# Patient Record
Sex: FEMALE | Race: WHITE
[De-identification: names, ages, dates, MRNs, and addresses within clinical notes are randomized per-mention and may not be internally consistent; named-entity substitution may affect disease eponyms.]

---

## 2021-11-22 ENCOUNTER — HOSPITAL ENCOUNTER (EMERGENCY)
Dept: HOSPITAL 97 - ER | Age: 36
Discharge: HOME | End: 2021-11-22
Payer: SELF-PAY

## 2021-11-22 VITALS — SYSTOLIC BLOOD PRESSURE: 130 MMHG | TEMPERATURE: 97.8 F | DIASTOLIC BLOOD PRESSURE: 81 MMHG | OXYGEN SATURATION: 98 %

## 2021-11-22 DIAGNOSIS — S83.92XA: Primary | ICD-10-CM
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
## 2021-11-22 DIAGNOSIS — Z88.0: ICD-10-CM

## 2021-11-22 DIAGNOSIS — F17.210: ICD-10-CM

## 2021-11-22 PROCEDURE — 96372 THER/PROPH/DIAG INJ SC/IM: CPT

## 2021-11-22 PROCEDURE — 99283 EMERGENCY DEPT VISIT LOW MDM: CPT

## 2021-11-22 NOTE — ER
Nurse's Notes                                                                                     

 [facility]                                                                 

Name: Shimon Varner                                                                            

Age: 36 yrs                                                                                       

Sex: Female                                                                                       

: 1985                                                                                   

MRN: [id number]                                                                                   

Arrival Date: 2021                                                                          

Time: 09:34                                                                                       

Account#: [id number]                                                                            

Bed 10                                                                                            

Private MD:                                                                                       

Diagnosis: Sprain of unspecified site of left knee;Pain in left knee                              

                                                                                                  

Presentation:                                                                                     

                                                                                             

09:41 Chief complaint: Patient states: slipped and twisted left knee in the shower.           aa5 

      Coronavirus screen: At this time, the client does not indicate any symptoms associated      

      with coronavirus-19. Ebola Screen: No symptoms or risks identified at this time.            

      Initial Sepsis Screen: Does the patient meet any 2 criteria? HR > 90 bpm. Does the          

      patient have a suspected source of infection? No. Patient's initial sepsis screen is        

      negative. Risk Assessment: Do you want to hurt yourself or someone else? Patient            

      reports no desire to harm self or others. Onset of symptoms was 2021.          

09:41 Acuity: JULIAN 4                                                                           aa5 

09:41 Method Of Arrival: Wheelchair                                                           aa5 

                                                                                                  

Historical:                                                                                       

- Allergies:                                                                                      

09:42 PENICILLINS;                                                                            aa5 

- PMHx:                                                                                           

09:42 Anxiety; PTSD; Depressive disorder; Bipolar disorder;                                   aa5 

- PSHx:                                                                                           

09:42  section;                                                                       aa5 

                                                                                                  

- Immunization history:: Client reports receiving the Lc \T\ Lc single-dose             

  vaccine.                                                                                        

- Social history:: Smoking status: Patient reports the use of cigarette tobacco                   

  products, smokes one-half pack cigarettes per day.                                              

                                                                                                  

                                                                                                  

Screening:                                                                                        

10:42 Abuse screen: Denies threats or abuse. Denies injuries from another. Nutritional        iw  

      screening: No deficits noted. Tuberculosis screening: No symptoms or risk factors           

      identified. Fall Risk None identified.                                                      

                                                                                                  

Assessment:                                                                                       

10:41 General: Appears in no apparent distress. Behavior is calm, cooperative. Pain:          iw  

      Complains of pain in left knee. Neuro: No deficits noted. Level of Consciousness is         

      awake, alert, obeys commands, Oriented to person, place, time, situation. Respiratory:      

      Respiratory effort is even, unlabored, Respiratory pattern is regular, symmetrical.         

      Derm: Skin is intact, is healthy with good turgor. Musculoskeletal: Range of motion:        

      limited in left knee.                                                                       

                                                                                                  

Vital Signs:                                                                                      

09:41  / 81; Pulse 92; Resp 18 S; Temp 97.8(TE); Pulse Ox 98% on R/A; Weight 151.95 kg  aa5 

      (R); Height 5 ft. 6 in. (167.64 cm) (R);                                                    

09:41 Body Mass Index 54.07 (151.95 kg, 167.64 cm)                                            aa5 

                                                                                                  

ED Course:                                                                                        

09:34 Patient arrived in ED.                                                                  as  

09:41 Arm band placed on.                                                                     aa5 

09:42 Triage completed.                                                                       aa5 

09:51 Iker Randall NP is PHCP.                                                           pm1 

09:51 Dae Benitez MD is Attending Physician.                                              pm1 

09:52 Shanti Haines RN is Primary Nurse.                                                   iw  

10:38 Knee Left 3 View In Process Unspecified.                                                EDMS

10:41 Patient has correct armband on for positive identification.                             iw  

11:42 Knee immobilizer applied on left knee.                                                  dh3 

11:48 No provider procedures requiring assistance completed. Patient did not have IV access   iw  

      during this emergency room visit.                                                           

                                                                                                  

Administered Medications:                                                                         

10:22 Drug: Ketorolac 60 mg Route: IM; Site: left vastus lateralis;                           iw  

14:55 Follow up: Response: No adverse reaction                                                iw  

                                                                                                  

                                                                                                  

Outcome:                                                                                          

11:31 Discharge ordered by MD.                                                                pm1 

11:48 Discharged to home ambulatory, with family.                                             iw  

11:48 Condition: good                                                                             

11:48 Discharge instructions given to patient, Instructed on discharge instructions, follow       

      up and referral plans. Demonstrated understanding of instructions, follow-up care,          

      medications.                                                                                

11:49 Patient left the ED.                                                                    iw  

                                                                                                  

Signatures:                                                                                       

Dispatcher [employer]                           EDMS                                                 

Jana Villanueva                             as                                                   

Shanti Haines RN RN                                                      

Radha Zapata RN                     RN   aa5                                                  

Iker Randall NP                    NP   pm1                                                  

Tiny Dooley                              3                                                  

                                                                                                  

Corrections: (The following items were deleted from the chart)                                    

09:43 09:42 PSHx: Carotid endarterectomy; aa5                                                 aa5 

09:43 09:42 Immunization history: Client reports receiving the 2nd dose of the Covid vaccine, aa5 

      aa5                                                                                         

09:44 09:41 Initial Sepsis Screen: Does the patient meet any 2 criteria? No. Patient's        aa5 

      initial sepsis screen is negative. Does the patient have a suspected source of              

      infection? No. Patient's initial sepsis screen is negative. aa5                             

                                                                                                  

**************************************************************************************************

## 2021-11-22 NOTE — EDPHYS
Physician Documentation                                                                           

 [facility]                                                                 

Name: Shimon Varner                                                                            

Age: 36 yrs                                                                                       

Sex: Female                                                                                       

: 1985                                                                                   

MRN: [id number]                                                                                   

Arrival Date: 2021                                                                          

Time: 09:34                                                                                       

Account#: [id number]                                                                            

Bed 10                                                                                            

Private MD:                                                                                       

ED Physician Dae Benitez                                                                       

HPI:                                                                                              

                                                                                             

10:05 This 36 yrs old Female presents to ER via Wheelchair with complaints of Knee Injury.    pm1 

10:05 The patient presents with pain, that is acute. The complaints affect the left knee.     pm1 

      Context: The problem was sustained Stepping out of shower, resulted from twisting of        

      the extremity, Slip on water, the patient is able to ambulate, Problem is a result from     

      a previous injury: No. Onset: The symptoms/episode began/occurred today. Modifying          

      factors: The symptoms are alleviated by elevating leg, remaining still, the symptoms        

      are aggravated by movement, weight bearing, bending knee. Associated signs and              

      symptoms: Pertinent negatives numbness, tingling. Treatment prior to arrival includes:      

      no previous treatment. Severity of symptoms: in the emergency department the symptoms       

      are unchanged. The patient has not experienced similar symptoms in the past. The            

      patient has not recently seen a physician. Patient was stepping out of the shower and       

      slipped resulting in twisting of the left knee. Patient did not fall to the ground.         

                                                                                                  

Historical:                                                                                       

- Allergies:                                                                                      

09:42 PENICILLINS;                                                                            aa5 

- PMHx:                                                                                           

09:42 Anxiety; PTSD; Depressive disorder; Bipolar disorder;                                   aa5 

- PSHx:                                                                                           

09:42  section;                                                                       aa5 

                                                                                                  

- Immunization history:: Client reports receiving the Lc \T\ Lc single-dose             

  vaccine.                                                                                        

- Social history:: Smoking status: Patient reports the use of cigarette tobacco                   

  products, smokes one-half pack cigarettes per day.                                              

                                                                                                  

                                                                                                  

ROS:                                                                                              

10:05 Constitutional: Negative for fever, chills, and weight loss, Cardiovascular: Negative   pm1 

      for chest pain, palpitations, and edema, Respiratory: Negative for shortness of breath,     

      cough, wheezing, and pleuritic chest pain.                                                  

10:05 Skin: Negative for injury, rash, and discoloration, Neuro: Negative for headache,           

      weakness, numbness, tingling, and seizure.                                                  

10:05 MS/extremity: Positive for pain, swelling, of the left knee, Negative for decreased         

      range of motion, deformity.                                                                 

10:05 All other systems are negative.                                                             

                                                                                                  

Exam:                                                                                             

10:05 Constitutional:  This is a well developed, well nourished patient who is awake, alert,  pm1 

      and in no acute distress. Head/Face:  Normocephalic, atraumatic.                            

10:05 Cardiovascular: Exam negative for  acute changes, Rate: normal, Rhythm: regular,            

      Pulses: no pulse deficits are appreciated.                                                  

10:05 Respiratory: Exam negative for  acute changes, respiratory distress, shortness of           

      breath.                                                                                     

10:05 Musculoskeletal/extremity: Extremities: noted in the left knee: Pain to lateral aspect      

      of knee with rotation of lower leg to medial aspect and pain to medial aspect of knee       

      with rotation of lower leg to lateral aspect.  No pain with drawer test, There is no        

      evidence of  decreased ROM, deformity.                                                      

10:05 Skin: Exam negative for                                                                     

10:05 Neuro: Exam negative for acute changes, Orientation: is normal, Mentation: is normal,       

      Motor: is normal, moves all fours.                                                          

                                                                                                  

Vital Signs:                                                                                      

09:41  / 81; Pulse 92; Resp 18 S; Temp 97.8(TE); Pulse Ox 98% on R/A; Weight 151.95 kg  aa5 

      (R); Height 5 ft. 6 in. (167.64 cm) (R);                                                    

09:41 Body Mass Index 54.07 (151.95 kg, 167.64 cm)                                            aa5 

                                                                                                  

MDM:                                                                                              

10:01 Patient medically screened.                                                             pm1 

11:30 Data reviewed: vital signs. Data interpreted: Pulse oximetry: on room air is 98 %.      pm1 

      Interpretation: normal.                                                                     

11:30 Counseling: I had a detailed discussion with the patient and/or guardian regarding: the pm1 

      historical points, exam findings, and any diagnostic results supporting the                 

      discharge/admit diagnosis, radiology results, the need for outpatient follow up, a          

      orthopedic surgeon, possible MRI and further treatment and evaluation, to return to the     

      emergency department if symptoms worsen or persist or if there are any questions or         

      concerns that arise at home.                                                                

                                                                                                  

                                                                                             

10:05 Order name: Knee Left 3 View XRAY                                                       pm1 

                                                                                             

10:05 Order name: Knee Left 3 View; Complete Time: 11:11                                      EDMS

                                                                                             

11:12 Order name: Knee Immobilizer; Complete Time: 11:41                                      pm1 

                                                                                                  

Administered Medications:                                                                         

10: Drug: Ketorolac 60 mg Route: IM; Site: left vastus lateralis;                           iw  

14:55 Follow up: Response: No adverse reaction                                                iw  

                                                                                                  

                                                                                                  

Disposition:                                                                                      

13:38 Co-signature as Attending Physician, Dae Benitez MD I agree with the assessment and   kdr 

      plan of care.                                                                               

                                                                                                  

Disposition Summary:                                                                              

21 11:31                                                                                    

Discharge Ordered                                                                                 

      Location: Home                                                                          pm1 

      Problem: new                                                                            pm1 

      Symptoms: have improved                                                                 pm1 

      Condition: Stable                                                                       pm1 

      Diagnosis                                                                                   

        - Sprain of unspecified site of left knee                                             pm1 

        - Pain in left knee                                                                   pm1 

      Followup:                                                                               pm1 

        - With: Emergency Department                                                               

        - When: As needed                                                                          

        - Reason: Worsening of condition                                                           

      Followup:                                                                               pm1 

        - With: Private Physician                                                                  

        - When: 2 - 3 days                                                                         

        - Reason: Recheck today's complaints, Continuance of care, Re-evaluation by your           

      physician                                                                                   

      Discharge Instructions:                                                                     

        - Discharge Summary Sheet                                                             pm1 

        - Elastic Bandage and RICE Therapy                                                    pm1 

        - Knee Sprain, Adult                                                                  pm1 

        - Crutch Use, Adult                                                                   pm1 

        - How to Use a Knee Immobilizer                                                       pm1 

        - Acute Knee Pain, Adult                                                              pm1 

      Forms:                                                                                      

        - Medication Reconciliation Form                                                      pm1 

        - Thank You Letter                                                                    pm1 

        - Antibiotic Education                                                                pm1 

        - Prescription Opioid Use                                                             pm1 

      Prescriptions:                                                                              

        - Diclofenac Sodium 75 mg Oral tablet,delayed release (DR/EC)                              

            - take 1 tablet by ORAL route 2 times per day As needed; 30 tablet; Refills: 0,   pm1 

      Product Selection Permitted                                                                 

Signatures:                                                                                       

Dispatcher [employer]                           EDMS                                                 

Dae Benitez MD MD   kdr                                                  

Shanti Haines, RN                     RN   iw                                                   

Radha Zapata RN                     RN   aa5                                                  

Iker Randall, MAGY                    NP   pm1                                                  

                                                                                                  

Corrections: (The following items were deleted from the chart)                                    

09:43 09:42 PSHx: Carotid endarterectomy; aa5                                                 aa5 

09:43 09:42 Immunization history: Client reports receiving the 2nd dose of the Covid vaccine, aa5 

      aa5                                                                                         

11:42 11:12 Crutches ordered. pm1                                                             dh3 

                                                                                                  

**************************************************************************************************

## 2021-11-22 NOTE — RAD REPORT
EXAM DESCRIPTION:  RAD - Knee Left 3 View - 11/22/2021 10:38 am

 

CLINICAL HISTORY:  PAIN

 

COMPARISON:  No comparisons

 

FINDINGS:  No acute fracture or dislocation is seen. Small suprapatellar joint effusion is present.

## 2025-04-08 ENCOUNTER — HOSPITAL ENCOUNTER (EMERGENCY)
Dept: HOSPITAL 97 - ER | Age: 40
Discharge: HOME | End: 2025-04-08
Payer: SELF-PAY

## 2025-04-08 VITALS — TEMPERATURE: 98 F

## 2025-04-08 VITALS — OXYGEN SATURATION: 97 % | DIASTOLIC BLOOD PRESSURE: 75 MMHG | SYSTOLIC BLOOD PRESSURE: 121 MMHG

## 2025-04-08 DIAGNOSIS — J15.8: ICD-10-CM

## 2025-04-08 DIAGNOSIS — K29.00: ICD-10-CM

## 2025-04-08 DIAGNOSIS — R11.10: Primary | ICD-10-CM

## 2025-04-08 DIAGNOSIS — E87.6: ICD-10-CM

## 2025-04-08 LAB
ALBUMIN SERPL BCP-MCNC: 3.6 G/DL (ref 3.4–5)
ALBUMIN/GLOB SERPL: 0.8 {RATIO} (ref 1.1–1.8)
ANION GAP SERPL CALC-SCNC: 14.2 MEQ/L (ref 5–15)
BILIRUB INDIRECT SERPL-MCNC: 0.3 MG/DL (ref 0.2–0.8)
GLOBULIN SER CALC-MCNC: 4.4 G/DL (ref 2.3–3.5)
HCT VFR BLD CALC: 34.4 % (ref 36–45)
HGB BLD-MCNC: 11.1 G/DL (ref 12–15)
INR BLD: 1.19
LYMPHOCYTES # SPEC AUTO: 1.3 K/UL (ref 0.7–4.9)
MAGNESIUM SERPL-MCNC: 2.2 MG/DL (ref 1.6–2.4)
MCHC RBC AUTO-ENTMCNC: 32.3 G/DL (ref 32–36)
MCV RBC: 77.5 FL (ref 80–100)
NRBC BLD AUTO-RTO: 0.1 % (ref 0–0)
PMV BLD: 8.4 FL (ref 7.6–11.3)
POTASSIUM SERPL-SCNC: 3.2 MEQ/L (ref 3.5–5.1)
PROTHROMBIN TIME: 13.5 SECONDS (ref 10–13)
RBC # BLD: 4.43 M/UL (ref 3.86–4.86)
TROPONIN I SERPL HS-MCNC: 3.8 PG/ML (ref ?–58.9)

## 2025-04-08 PROCEDURE — 84484 ASSAY OF TROPONIN QUANT: CPT

## 2025-04-08 PROCEDURE — 36415 COLL VENOUS BLD VENIPUNCTURE: CPT

## 2025-04-08 PROCEDURE — 85610 PROTHROMBIN TIME: CPT

## 2025-04-08 PROCEDURE — 96374 THER/PROPH/DIAG INJ IV PUSH: CPT

## 2025-04-08 PROCEDURE — 86850 RBC ANTIBODY SCREEN: CPT

## 2025-04-08 PROCEDURE — 71045 X-RAY EXAM CHEST 1 VIEW: CPT

## 2025-04-08 PROCEDURE — 96361 HYDRATE IV INFUSION ADD-ON: CPT

## 2025-04-08 PROCEDURE — 80076 HEPATIC FUNCTION PANEL: CPT

## 2025-04-08 PROCEDURE — 74177 CT ABD & PELVIS W/CONTRAST: CPT

## 2025-04-08 PROCEDURE — 85025 COMPLETE CBC W/AUTO DIFF WBC: CPT

## 2025-04-08 PROCEDURE — 83735 ASSAY OF MAGNESIUM: CPT

## 2025-04-08 PROCEDURE — 83690 ASSAY OF LIPASE: CPT

## 2025-04-08 PROCEDURE — 96375 TX/PRO/DX INJ NEW DRUG ADDON: CPT

## 2025-04-08 PROCEDURE — 80048 BASIC METABOLIC PNL TOTAL CA: CPT

## 2025-04-08 PROCEDURE — 99285 EMERGENCY DEPT VISIT HI MDM: CPT

## 2025-04-08 PROCEDURE — 93005 ELECTROCARDIOGRAM TRACING: CPT

## 2025-04-08 PROCEDURE — 86900 BLOOD TYPING SEROLOGIC ABO: CPT

## 2025-04-08 PROCEDURE — 83880 ASSAY OF NATRIURETIC PEPTIDE: CPT

## 2025-04-08 PROCEDURE — 86901 BLOOD TYPING SEROLOGIC RH(D): CPT

## 2025-04-08 PROCEDURE — 71260 CT THORAX DX C+: CPT

## 2025-04-08 PROCEDURE — 84703 CHORIONIC GONADOTROPIN ASSAY: CPT
